# Patient Record
Sex: MALE | Race: WHITE | URBAN - METROPOLITAN AREA
[De-identification: names, ages, dates, MRNs, and addresses within clinical notes are randomized per-mention and may not be internally consistent; named-entity substitution may affect disease eponyms.]

---

## 2021-06-18 ENCOUNTER — APPOINTMENT (OUTPATIENT)
Dept: URBAN - METROPOLITAN AREA SURGERY 9 | Age: 36
Setting detail: DERMATOLOGY
End: 2021-06-18

## 2021-06-18 PROBLEM — D23.4 OTHER BENIGN NEOPLASM OF SKIN OF SCALP AND NECK: Status: ACTIVE | Noted: 2021-06-18

## 2021-06-18 PROBLEM — D23.39 OTHER BENIGN NEOPLASM OF SKIN OF OTHER PARTS OF FACE: Status: ACTIVE | Noted: 2021-06-18

## 2021-06-18 PROCEDURE — 99202 OFFICE O/P NEW SF 15 MIN: CPT

## 2021-06-18 PROCEDURE — OTHER COUNSELING: OTHER

## 2021-06-18 PROCEDURE — OTHER OTHER: OTHER

## 2021-06-18 PROCEDURE — OTHER OBSERVATION: OTHER

## 2021-06-18 PROCEDURE — OTHER DEFER: OTHER

## 2021-06-18 NOTE — PROCEDURE: OTHER
Render Risk Assessment In Note?: no
Other (Free Text): Discussed biopsy vs. observation. Patient prefers to continue to observe as this has been stable for 20+ years. Recommend self monitoring at home with photo on cell phone. If changing color or size or changes mind regarding biopsy return anytime for re-eval. \\n\\nSee attached photo
Note Text (......Xxx Chief Complaint.): This diagnosis correlates with the
Detail Level: Simple